# Patient Record
Sex: FEMALE | Race: WHITE | NOT HISPANIC OR LATINO | URBAN - METROPOLITAN AREA
[De-identification: names, ages, dates, MRNs, and addresses within clinical notes are randomized per-mention and may not be internally consistent; named-entity substitution may affect disease eponyms.]

---

## 2017-09-18 ENCOUNTER — OUTPATIENT (OUTPATIENT)
Dept: OUTPATIENT SERVICES | Facility: HOSPITAL | Age: 52
LOS: 1 days | Discharge: HOME | End: 2017-09-18

## 2017-09-19 LAB — SURGICAL PATHOLOGY STUDY: SIGNIFICANT CHANGE UP

## 2017-09-21 DIAGNOSIS — F31.9 BIPOLAR DISORDER, UNSPECIFIED: ICD-10-CM

## 2017-09-21 DIAGNOSIS — I34.1 NONRHEUMATIC MITRAL (VALVE) PROLAPSE: ICD-10-CM

## 2017-09-21 DIAGNOSIS — N72 INFLAMMATORY DISEASE OF CERVIX UTERI: ICD-10-CM

## 2022-12-17 ENCOUNTER — EMERGENCY (EMERGENCY)
Facility: HOSPITAL | Age: 57
LOS: 1 days | Discharge: HOME | End: 2022-12-17
Attending: EMERGENCY MEDICINE | Admitting: EMERGENCY MEDICINE
Payer: COMMERCIAL

## 2022-12-17 VITALS
HEIGHT: 65 IN | OXYGEN SATURATION: 98 % | WEIGHT: 166.89 LBS | SYSTOLIC BLOOD PRESSURE: 130 MMHG | RESPIRATION RATE: 18 BRPM | TEMPERATURE: 98 F | HEART RATE: 86 BPM | DIASTOLIC BLOOD PRESSURE: 77 MMHG

## 2022-12-17 DIAGNOSIS — G47.00 INSOMNIA, UNSPECIFIED: ICD-10-CM

## 2022-12-17 DIAGNOSIS — M54.6 PAIN IN THORACIC SPINE: ICD-10-CM

## 2022-12-17 DIAGNOSIS — Z85.820 PERSONAL HISTORY OF MALIGNANT MELANOMA OF SKIN: ICD-10-CM

## 2022-12-17 DIAGNOSIS — F32.A DEPRESSION, UNSPECIFIED: ICD-10-CM

## 2022-12-17 DIAGNOSIS — M54.2 CERVICALGIA: ICD-10-CM

## 2022-12-17 LAB
ANION GAP SERPL CALC-SCNC: 10 MMOL/L — SIGNIFICANT CHANGE UP (ref 5–17)
BASOPHILS # BLD AUTO: 0.05 K/UL — SIGNIFICANT CHANGE UP (ref 0–0.2)
BASOPHILS NFR BLD AUTO: 0.4 % — SIGNIFICANT CHANGE UP (ref 0–2)
BUN SERPL-MCNC: 25 MG/DL — HIGH (ref 7–23)
CALCIUM SERPL-MCNC: 9.6 MG/DL — SIGNIFICANT CHANGE UP (ref 8.4–10.5)
CHLORIDE SERPL-SCNC: 103 MMOL/L — SIGNIFICANT CHANGE UP (ref 96–108)
CO2 SERPL-SCNC: 27 MMOL/L — SIGNIFICANT CHANGE UP (ref 22–31)
CREAT SERPL-MCNC: 1.16 MG/DL — SIGNIFICANT CHANGE UP (ref 0.5–1.3)
D DIMER BLD IA.RAPID-MCNC: <150 NG/ML DDU — SIGNIFICANT CHANGE UP
EGFR: 54 ML/MIN/1.73M2 — LOW
EOSINOPHIL # BLD AUTO: 0.1 K/UL — SIGNIFICANT CHANGE UP (ref 0–0.5)
EOSINOPHIL NFR BLD AUTO: 0.9 % — SIGNIFICANT CHANGE UP (ref 0–6)
GLUCOSE SERPL-MCNC: 111 MG/DL — HIGH (ref 70–99)
HCT VFR BLD CALC: 36.4 % — SIGNIFICANT CHANGE UP (ref 34.5–45)
HGB BLD-MCNC: 11.8 G/DL — SIGNIFICANT CHANGE UP (ref 11.5–15.5)
IMM GRANULOCYTES NFR BLD AUTO: 0.4 % — SIGNIFICANT CHANGE UP (ref 0–0.9)
LYMPHOCYTES # BLD AUTO: 27.4 % — SIGNIFICANT CHANGE UP (ref 13–44)
LYMPHOCYTES # BLD AUTO: 3.11 K/UL — SIGNIFICANT CHANGE UP (ref 1–3.3)
MCHC RBC-ENTMCNC: 29.7 PG — SIGNIFICANT CHANGE UP (ref 27–34)
MCHC RBC-ENTMCNC: 32.4 GM/DL — SIGNIFICANT CHANGE UP (ref 32–36)
MCV RBC AUTO: 91.7 FL — SIGNIFICANT CHANGE UP (ref 80–100)
MONOCYTES # BLD AUTO: 0.7 K/UL — SIGNIFICANT CHANGE UP (ref 0–0.9)
MONOCYTES NFR BLD AUTO: 6.2 % — SIGNIFICANT CHANGE UP (ref 2–14)
NEUTROPHILS # BLD AUTO: 7.32 K/UL — SIGNIFICANT CHANGE UP (ref 1.8–7.4)
NEUTROPHILS NFR BLD AUTO: 64.7 % — SIGNIFICANT CHANGE UP (ref 43–77)
NRBC # BLD: 0 /100 WBCS — SIGNIFICANT CHANGE UP (ref 0–0)
PLATELET # BLD AUTO: 276 K/UL — SIGNIFICANT CHANGE UP (ref 150–400)
POTASSIUM SERPL-MCNC: 4.2 MMOL/L — SIGNIFICANT CHANGE UP (ref 3.5–5.3)
POTASSIUM SERPL-SCNC: 4.2 MMOL/L — SIGNIFICANT CHANGE UP (ref 3.5–5.3)
RBC # BLD: 3.97 M/UL — SIGNIFICANT CHANGE UP (ref 3.8–5.2)
RBC # FLD: 12.8 % — SIGNIFICANT CHANGE UP (ref 10.3–14.5)
SODIUM SERPL-SCNC: 140 MMOL/L — SIGNIFICANT CHANGE UP (ref 135–145)
WBC # BLD: 11.33 K/UL — HIGH (ref 3.8–10.5)
WBC # FLD AUTO: 11.33 K/UL — HIGH (ref 3.8–10.5)

## 2022-12-17 PROCEDURE — 80048 BASIC METABOLIC PNL TOTAL CA: CPT

## 2022-12-17 PROCEDURE — 36415 COLL VENOUS BLD VENIPUNCTURE: CPT

## 2022-12-17 PROCEDURE — 85379 FIBRIN DEGRADATION QUANT: CPT

## 2022-12-17 PROCEDURE — 71046 X-RAY EXAM CHEST 2 VIEWS: CPT | Mod: 26

## 2022-12-17 PROCEDURE — 85025 COMPLETE CBC W/AUTO DIFF WBC: CPT

## 2022-12-17 PROCEDURE — 71046 X-RAY EXAM CHEST 2 VIEWS: CPT

## 2022-12-17 PROCEDURE — 99285 EMERGENCY DEPT VISIT HI MDM: CPT | Mod: 25

## 2022-12-17 PROCEDURE — 93005 ELECTROCARDIOGRAM TRACING: CPT

## 2022-12-17 PROCEDURE — 93010 ELECTROCARDIOGRAM REPORT: CPT | Mod: 59

## 2022-12-17 RX ORDER — LIDOCAINE 4 G/100G
1 CREAM TOPICAL ONCE
Refills: 0 | Status: COMPLETED | OUTPATIENT
Start: 2022-12-17 | End: 2022-12-17

## 2022-12-17 RX ORDER — METHOCARBAMOL 500 MG/1
2 TABLET, FILM COATED ORAL
Qty: 30 | Refills: 0
Start: 2022-12-17 | End: 2022-12-21

## 2022-12-17 RX ORDER — METHOCARBAMOL 500 MG/1
750 TABLET, FILM COATED ORAL ONCE
Refills: 0 | Status: COMPLETED | OUTPATIENT
Start: 2022-12-17 | End: 2022-12-17

## 2022-12-17 RX ADMIN — LIDOCAINE 1 PATCH: 4 CREAM TOPICAL at 18:08

## 2022-12-17 RX ADMIN — METHOCARBAMOL 750 MILLIGRAM(S): 500 TABLET, FILM COATED ORAL at 18:08

## 2022-12-17 NOTE — ED ADULT NURSE NOTE - OBJECTIVE STATEMENT
57y F, presents to the ED c/o left shoulder pain for 3 days.  Denies chest pain, SOB, numbness, tingling. Pt A&Ox4, ambulatory with steady gait, speaking in clear/full sentences, vital signs stable.

## 2022-12-17 NOTE — ED PROVIDER NOTE - PATIENT PORTAL LINK FT
You can access the FollowMyHealth Patient Portal offered by [facility] by registering at the following website: [email or web address]. By joining [employer]’s FollowMyHealth portal, you will also be able to view your health information using other applications (apps) compatible with our system.

## 2022-12-17 NOTE — ED PROVIDER NOTE - CLINICAL SUMMARY MEDICAL DECISION MAKING FREE TEXT BOX
Pt p/w upper back pain radiating to the arm. DDx includes but not limited to MSK strain / spasm, radiculopathy. There are no EKG changes to suggest ischemia, infarction, or pericarditis. H&P is not c/w dissection. Very low suspicion ACS based on H&P. Given recent surgery / malignancy, will get D-dimer, if neg XR. Topical analgesia + muscle relaxant. If w/u neg anticipate d/c w/ f/u PCP

## 2022-12-17 NOTE — ED PROVIDER NOTE - PHYSICAL EXAMINATION
Constitutional: Well appearing, awake, alert, oriented to person, place, time/situation and in no apparent distress.  ENMT: Airway patent. Normal MM  Eyes: Clear bilaterally  Cardiac: Normal rate, regular rhythm.  Heart sounds S1, S2.  No murmurs, rubs or gallops.  Respiratory: Breaths sounds equal and clear b/l. No W/R/R. No increased WOB, tachypnea, hypoxia, or accessory mm use. Pt speaks in full sentences.   Musculoskeletal: No midline spinal ttp. + mild ttp adjacent to L scapula and in L trapezius mm. 5/5 mm strength in all mm groups in b/l UE. median/ ulnar / radial nn intact b/l. 2+ radial pulses. normal sensation. cap refill < 2 sec. arms symmetric in size. no changes in calor or pallor. strength testing reproduces pain  Neuro: Alert and oriented x 3, face symmetric and speech fluent. Strength 5/5 x 4 ext and symmetric, nml gross motor movement, nml gait. No focal deficits noted.  Skin: Skin normal color for race, warm, dry and intact. No evidence of rash.  Psych: Alert and oriented to person, place, time/situation. normal mood and affect. no apparent risk to self or others.

## 2022-12-17 NOTE — ED PROVIDER NOTE - IV ALTEPLASE INCLUSION HIDDEN
Received report from Armando LEMUS. Discussed plan of care and safety measures in place. No further needs at this time.    show

## 2022-12-17 NOTE — ED PROVIDER NOTE - OBJECTIVE STATEMENT
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 She awoke with the pain in the night. The pain is sharp and sometimes affected by movement and certain positions. She reports preceding neck pain. She states she works out twice weekly, 30 min at a time. She states 2 days before this she was exercising performing pushups and seated rows but she doesn't think she did anything different. No CP, SOB. NO numbness/tingling, nor weakness. Pain limits her ROM. She has taken NSAIDs w/o relief. She had never had CP while exercise nor walking. She walked 1.5 miles here and felt ok. She had normal calcium scoring. It also hurts worse w/ sneezing and blowing her nose.  Non smoker. No drug use.  No trauma

## 2022-12-17 NOTE — ED PROVIDER NOTE - NS ED ROS FT
Constitutional: No fever or chills.   Eyes: No pain, blurry vision, or discharge.  ENMT: No hearing changes, pain, discharge or infections. No neck pain or stiffness.  Cardiac: No chest pain, SOB or edema. No chest pain with exertion.  Respiratory: No cough or respiratory distress. No hemoptysis. No history of asthma or RAD.  GI: No nausea, vomiting, diarrhea or abdominal pain.  : No dysuria, frequency or burning.  MS: See HPI  Neuro: No headache or focal weakness. No LOC.  Skin: No skin rash.   Endocrine: No history of thyroid disease or diabetes.  Except as documented in the HPI, all other systems are negative.

## 2022-12-17 NOTE — ED ADULT TRIAGE NOTE - CHIEF COMPLAINT QUOTE
"I have been having some left shoulder pain for about 3 days but it radiates to my left arm and it does not feel worse with moving so I am scared"

## 2024-04-01 NOTE — ED ADULT NURSE NOTE - PRO INTERPRETER NEED 2
Detail Level: Generalized
English
Xolair Pregnancy And Lactation Text: This medication is Pregnancy Category B and is considered safe during pregnancy. This medication is excreted in breast milk.

## 2024-04-25 ENCOUNTER — EMERGENCY (EMERGENCY)
Facility: HOSPITAL | Age: 59
LOS: 1 days | Discharge: HOME | End: 2024-04-25
Attending: STUDENT IN AN ORGANIZED HEALTH CARE EDUCATION/TRAINING PROGRAM | Admitting: STUDENT IN AN ORGANIZED HEALTH CARE EDUCATION/TRAINING PROGRAM
Payer: COMMERCIAL

## 2024-04-25 VITALS
WEIGHT: 167.99 LBS | TEMPERATURE: 98 F | SYSTOLIC BLOOD PRESSURE: 172 MMHG | OXYGEN SATURATION: 98 % | DIASTOLIC BLOOD PRESSURE: 49 MMHG | HEART RATE: 83 BPM | HEIGHT: 65 IN | RESPIRATION RATE: 18 BRPM

## 2024-04-25 PROCEDURE — 99285 EMERGENCY DEPT VISIT HI MDM: CPT | Mod: 25

## 2024-04-25 PROCEDURE — 82962 GLUCOSE BLOOD TEST: CPT

## 2024-04-25 PROCEDURE — 99283 EMERGENCY DEPT VISIT LOW MDM: CPT

## 2024-04-25 PROCEDURE — 93005 ELECTROCARDIOGRAM TRACING: CPT

## 2024-04-25 NOTE — ED ADULT NURSE NOTE - OBJECTIVE STATEMENT
pt walked in aox4, anxious. states she accidentally took 3 3mg lunesta pills instead of her 3 1 mg lunesta pills [id number]. pt awake and alert, vss. pt had recent L hip replacement 2 weeks ago, walks with a cane. ekg in progress. pt denies blurred vision, weakness, chest pain, sob, abdominal pain, n/v/d, difficulty breathing.

## 2024-04-25 NOTE — ED ADULT NURSE REASSESSMENT NOTE - NS ED NURSE REASSESS COMMENT FT1
Pt placed on pulse oximetry, still awake and alert. SpO2 97% on room air, no respiratory difficulties.

## 2024-04-25 NOTE — ED ADULT NURSE REASSESSMENT NOTE - NS ED NURSE REASSESS COMMENT FT1
Pt provided emergency contacts:   Karin, daughter [phone]  Mary, daughter [phone]  Vivian, neighbor [phone]

## 2024-04-25 NOTE — ED ADULT NURSE NOTE - NSFALLRISKINTERV_ED_ALL_ED

## 2024-04-25 NOTE — ED ADULT NURSE NOTE - CHPI ED NUR SYMPTOMS NEG
no abdominal distension/no abdominal pain/no confusion/no disorientation/no pain/no vomiting/no weakness

## 2024-04-26 VITALS
HEART RATE: 91 BPM | SYSTOLIC BLOOD PRESSURE: 124 MMHG | OXYGEN SATURATION: 97 % | DIASTOLIC BLOOD PRESSURE: 82 MMHG | RESPIRATION RATE: 18 BRPM

## 2024-04-26 RX ORDER — ACETAMINOPHEN 500 MG
650 TABLET ORAL ONCE
Refills: 0 | Status: DISCONTINUED | OUTPATIENT
Start: 2024-04-26 | End: 2024-04-26

## 2024-04-26 NOTE — ED PROVIDER NOTE - CLINICAL SUMMARY MEDICAL DECISION MAKING FREE TEXT BOX
Accidental Lunesta overdose. No sig symptoms.  [facility] poison control called, state to watch patient for 4 hours since time of ingestion.  Pt arrived obtunded. Exam reveals no acute signs or history of trauma, no indication of psychiatric complication. Pupils pinpoint. Breathing spontaneously at rate of 18 bpm throughout ED stay.  Will montior SPO2, Pt on RA.  Unremarkable EKG.  Will reassess after a period of metabolism; if alexx and without emergent complaint or finding, will be safe for discharge.    Pt observed for sobriety. At time of discharge, patient is breathing normally on RA, A&OX3, ambulatory with steady gait, tolerating PO.

## 2024-04-26 NOTE — ED PROVIDER NOTE - NSFOLLOWUPINSTRUCTIONS_ED_ALL_ED_FT
Follow up with your primary medical doctor as soon as possible.    Return to the emergency department if your symptoms worsen or if you develop new symptoms.  If you have any problems with followup, please call the ED Referral Coordinator at [phone].

## 2024-04-26 NOTE — ED PROVIDER NOTE - OBJECTIVE STATEMENT
57 yo F w PMH of insomnia on Lunesta, presents s/p Lunesta overdose today at 8 pm. Pt took 9 mg (3 x 3 mg pills) instead of 3 mg. Until recently has been prescribed 3 x 1 mg. Overdose was accidental. No sig drowsiness, respiratory depression, or bradycardia.

## 2024-04-29 DIAGNOSIS — Z03.6 ENCOUNTER FOR OBSERVATION FOR SUSPECTED TOXIC EFFECT FROM INGESTED SUBSTANCE RULED OUT: ICD-10-CM

## 2024-04-29 DIAGNOSIS — T42.6X1A POISONING BY OTHER ANTIEPILEPTIC AND SEDATIVE-HYPNOTIC DRUGS, ACCIDENTAL (UNINTENTIONAL), INITIAL ENCOUNTER: ICD-10-CM

## 2025-06-24 NOTE — ED PROVIDER NOTE - PATIENT PORTAL LINK FT
Subjective   Patient ID: Nirali Gonzalez is a 43 y.o. female who presents for [facility] (New patient to establish. Hasn't has PCP in a long time. /1) Left palm has a cyst like bump./2) Saturday had bleeding in her left ear. No injury. No pain./She is fasting).    HPI   History of Present Illness  The patient presents today with the primary reason for this visit being ear bleeding, along with concerns about nodules in her left hand.    She reports experiencing a growing, hardening nodule in her left hand for approximately 3 years. The nodule is mobile and sensitive to touch, particularly when driving, but does not cause consistent discomfort. A similar nodule is present in her left wrist, which is not bothersome. She suspects these nodules may be ganglion cysts, as her sister had a similar condition that required drainage.    On 06/21/2025, she experienced bleeding from her right ear, which she discovered upon waking. The blood was sufficient to saturate both ends of two Q-tips. No further bleeding has occurred since then. She also reports neck pain and headaches over the past few days, which radiate to her ears and jaw. She is uncertain if these symptoms are related to a known mouth infection.    She has a history of psoriasis, with a few spots on her scalp along with on bilateral elbows.    She smokes cigarettes and expresses a desire to quit. She previously attempted to quit using a medication 20 years ago, but it was unsuccessful. She currently works from home and smokes [employer].    GYNECOLOGICAL HISTORY:  - Menstrual Pain: Severe, including vomiting 2 days before and after her period starts, and heavy bleeding    She has a history of adenomyosis, diagnosed via ultrasound, and experiences severe menstrual symptoms. She has been advised that hysterectomy is the only treatment option, but she is not ready to proceed with this.    She has a history of polysubstance use disorder but reports no current  "illicit drug use. She has been abstinent for 8 years. She drinks alcohol socially and smokes marijuana once every other month. She has a history of pain medication use due to multiple injuries sustained while working as a .    SOCIAL HISTORY  The patient smokes [employer] and wants to quit. She drinks alcohol socially and smokes marijuana once every other month. She has a history of polysubstance abuse but reports no current illicit drug use.  Sober for last 3 years    FAMILY HISTORY  She reports no family history of colon cancer. Her family has a history of adenomyosis.       Review of Systems    Objective   /84   Pulse 110   Temp 36.4 °C (97.5 °F)   Resp 20   Ht 1.676 m (5' 6\")   Wt 85.7 kg (189 lb)   SpO2 97%   BMI 30.51 kg/m²     Physical Exam  Physical Exam  Ears: Dryness noted in the right ear canal. Eardrum and canal visible with a small amount of wax present. No blood behind the eardrum.  Extremities: Ganglion cysts noted on the right and left hands.       Assessment/Plan     Assessment & Plan    1. Left hand nodule.  - The nodule in the left hand is also likely a ganglion cyst.  It does bother her particularly when driving  - Physical examination confirms the presence of the ganglion cyst.  - Referral to an orthopedic surgeon has been made for further evaluation and potential removal.  - Patient advised to monitor for any changes in size or pain.    2. Right ear bleeding.  - The right ear shows signs of xerosis, which could be the cause of the bleeding.  - Physical examination reveals no evidence of blood behind the eardrum or any other abnormalities within your canal.  - Patient advised to monitor the condition closely and report any recurrence of bleeding.    3. Psoriasis.  - History of psoriasis with occasional spots on the scalp.  - Physical examination reveals evidence of small plaque within extending into significantly  - Patient advised to continue monitoring for " You can access the FollowMyHealth Patient Portal offered by [facility] by registering at the following website: [email or web address]. By joining [employer]’s FollowMyHealth portal, you will also be able to view your health information using other applications (apps) compatible with our system. any new psoriatic lesions.  - Discussed potential link between psoriasis and ear dryness.    4. Smoking cessation.  - Patient interested in quitting smoking.  - Chantix (varenicline) prescribed to aid in smoking cessation.  - Discussed R/B/A SE associated with this medication, patient states understanding.  - Patient advised to follow the instructions provided with the medication and report any adverse effects.    5. Adenomyosis.  - History of adenomyosis with severe symptoms, including vomiting and heavy bleeding during menstrual cycle.  - Referral to a gynecologist made for further evaluation and discussion of potential treatment options  -Is also due for cervical cancer screening    6. Health maintenance.  - Due for mammogram, colonoscopy, and cervical cancer screening.  -Is also due for Tdap/pneumonia sedation, referred to health department to complete.  - Cologuard test ordered as an alternative to colonoscopy.  - Fasting blood work ordered to be completed within a week.  - Patient advised to schedule these tests and follow up with the results.    Follow-up  - Patient will follow up in 1 month to review lab results and annual physical     Noah Chavira DO         This medical note was created with the assistance of artificial intelligence (AI) for documentation purposes. The content has been reviewed and confirmed by the healthcare provider for accuracy and completeness. Patient consented to the use of audio recording and use of AI during their visit.